# Patient Record
Sex: MALE | Race: WHITE | NOT HISPANIC OR LATINO | Employment: OTHER | URBAN - METROPOLITAN AREA
[De-identification: names, ages, dates, MRNs, and addresses within clinical notes are randomized per-mention and may not be internally consistent; named-entity substitution may affect disease eponyms.]

---

## 2017-05-08 ENCOUNTER — IMPORTED ENCOUNTER (OUTPATIENT)
Dept: URBAN - METROPOLITAN AREA CLINIC 43 | Facility: CLINIC | Age: 82
End: 2017-05-08

## 2017-05-08 PROBLEM — H35.3132: Noted: 2017-05-08

## 2017-05-12 ENCOUNTER — IMPORTED ENCOUNTER (OUTPATIENT)
Dept: URBAN - METROPOLITAN AREA CLINIC 43 | Facility: CLINIC | Age: 82
End: 2017-05-12

## 2017-08-25 ENCOUNTER — IMPORTED ENCOUNTER (OUTPATIENT)
Dept: URBAN - METROPOLITAN AREA CLINIC 43 | Facility: CLINIC | Age: 82
End: 2017-08-25

## 2018-04-16 NOTE — PATIENT DISCUSSION
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 NO SIGN OF ACTIVE INFLAMMATION. PHOTO TAKEN TODAY. REFER TO RETINAL SPECIALIST, DR. Earl Stevenson.

## 2018-04-16 NOTE — PATIENT DISCUSSION
SUBJECTIVE PERIOCULAR DISCOMFORT OD - NO EVIDENCE OF PROPTOSIS, RESTRICTION IN GAZE OR ORBITAL PATHOLOGY. PATIENT SLEEPS ON LEFT, MAY BE RELATED TO MILD NOCTURNAL EXPOSURE OD. TREAT DED.

## 2018-05-02 NOTE — PATIENT DISCUSSION
Myopia Counseling: The diagnosis of myopia (nearsightedness) was discussed with the patient. I explained to the patient that people who are nearsighted may be at an increased risk of a retinal detachment. Possible symptoms of retinal detachment including new onset of significant floaters or flashes or a sudden decrease in vision were reviewed with the patient. The patient understands that any of these symptoms require an immediate call to the office for an examination that day. Return for follow-up as scheduled.

## 2018-05-02 NOTE — PATIENT DISCUSSION
Chorioretinal Scar is an area of pigmentary change or fibrosis that is located on the inside surface of the eye. These may be the result of an old infection or injury, but in some cases we do not know the source. If the chorioretinal scar is in the central retina, it can cause decreased vision. However, in most cases it is located away from the center and does not cause any problems.

## 2018-05-02 NOTE — PATIENT DISCUSSION
RETINA IS ATTACHED OU:  SMALL PERIPHERAL FLAT CHORIORETINAL SCAR OD; NO HOLES OR TEARS SEEN ON DILATED EXAM TODAY.  RETINAL DETACHMENT SIGNS AND SYMPTOMS REVIEWED

## 2018-05-18 ENCOUNTER — IMPORTED ENCOUNTER (OUTPATIENT)
Dept: URBAN - METROPOLITAN AREA CLINIC 43 | Facility: CLINIC | Age: 83
End: 2018-05-18

## 2018-05-18 PROBLEM — H35.3132: Noted: 2018-05-18

## 2018-05-25 ENCOUNTER — IMPORTED ENCOUNTER (OUTPATIENT)
Dept: URBAN - METROPOLITAN AREA CLINIC 43 | Facility: CLINIC | Age: 83
End: 2018-05-25

## 2018-10-23 NOTE — PATIENT DISCUSSION
UNRELIABLE SCHIRMER TEST RESULTS - PT. DECLINES DRY EYE SYMPTOMS AND IS HAVING NO ISSUES WITH CONTACTS. DISC POSSIBLE NEED FOR PUNCTAL PLUGS AFTER PROCEDURE IF SYMPTOMS ARISE.

## 2018-10-25 NOTE — PATIENT DISCUSSION
PT UNDERSTANDS OPTIONS / RISKS AND DESIRES TO PROCEED WITH LASIK TO IMPROVE VA AND REDUCE DEPENDENCY ON GLS/CTLS.

## 2018-10-25 NOTE — PATIENT DISCUSSION
AFTER DISCUSSION, PATIENT WAS GIVEN THE OPTION TO NOT PROCEED WITH TREATMENT AND TO CONTINUE WITH GLASSES/CONTACTS.

## 2018-10-25 NOTE — PATIENT DISCUSSION
HIGH MYOPIA, OU- DISC OPT OF REFRACTIVE [id number]. DISCUSSED INCREASED RISK OF NEEDING A ENHANCEMENT AND HALO'S AROUND LIGHTS AT NIGHT.

## 2018-10-25 NOTE — PATIENT DISCUSSION
LARGE PUPILS, OU - DISC IN DETAIL THE INCREASED RISK OF NIGHT VISION SYMPTOMS SUCH GLARE, HALOS, STEAMERS AROUND LIGHTS AND STARBURSTS THAT MAY AFFECT VISION FOR WORK. DETAILS OF THE STUDY PERFORMED BY THE  AND FDA TO DETERMINE PROBABILITY OF GLARE AS A RESULT OF HAVING LASIK WAS REVIEWED WITH THE PATIENT. DISC IF SYMPTOMS PRESENT, EYE DROPS MAY HELP TO DECREASE SOME OF THESE SYMPTOMS BUT THAT THERE IS NO GUARANTEE.

## 2018-10-25 NOTE — PATIENT DISCUSSION
DISC ALTHOUGH NOT SEEN IN MY [facility] NOR IN [facility], THERE HAS BEEN RECENT REPORTS OF CHRONIC PAIN FOLLOWING LASIK.

## 2018-10-26 NOTE — PATIENT DISCUSSION
Continue: prednisolone acetate (prednisolone acetate): drops,suspension: 1% 1 drop four times a day as directed

## 2018-11-30 ENCOUNTER — IMPORTED ENCOUNTER (OUTPATIENT)
Dept: URBAN - METROPOLITAN AREA CLINIC 43 | Facility: CLINIC | Age: 83
End: 2018-11-30

## 2019-04-19 NOTE — PATIENT DISCUSSION
Stopped Today: prednisolone acetate (prednisolone acetate): drops,suspension: 1% 1 drop four times a day as directed

## 2019-04-19 NOTE — PATIENT DISCUSSION
SMALL PERIPHERAL FLAT CHORIORETINAL SCAR OD; NO HOLES OR TEARS SEEN ON DILATED EXAM TODAY.  RETINAL DETACHMENT SIGNS AND SYMPTOMS REVIEWED

## 2019-06-11 ENCOUNTER — IMPORTED ENCOUNTER (OUTPATIENT)
Dept: URBAN - METROPOLITAN AREA CLINIC 43 | Facility: CLINIC | Age: 84
End: 2019-06-11

## 2019-06-11 PROBLEM — H35.3231: Noted: 2019-06-11

## 2019-09-20 NOTE — PATIENT DISCUSSION
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 38

## 2019-09-27 ENCOUNTER — IMPORTED ENCOUNTER (OUTPATIENT)
Dept: URBAN - METROPOLITAN AREA CLINIC 43 | Facility: CLINIC | Age: 84
End: 2019-09-27

## 2020-04-18 ASSESSMENT — VISUAL ACUITY
OD_SC: J5
OD_SC: 20/40 -2
OD_OTHER: 20/100.
OD_OTHER: 20/70.
OS_SC: J2
OD_SC: 20/25-2
OD_SC: 20/25 -2
OS_SC: 20/30-2
OS_CC: J1+
OD_CC: J1
OS_SC: 20/30-2
OD_OTHER: 20/40-2.
OS_CC: J2
OD_SC: 20/30
OS_SC: 20/30
OS_SC: 20/25-2
OD_CC: J1+

## 2020-04-18 ASSESSMENT — TONOMETRY
OS_IOP_MMHG: 20.0
OS_IOP_MMHG: 16.0
OD_IOP_MMHG: 20.0
OD_IOP_MMHG: 19.0
OS_IOP_MMHG: 18.0
OD_IOP_MMHG: 17.0

## 2020-04-18 ASSESSMENT — KERATOMETRY
OS_AXISANGLE_DEGREES: 164
OD_AXISANGLE_DEGREES: 6
OS_K2POWER_DIOPTERS: 42.75
OD_K2POWER_DIOPTERS: 42.5
OD_K1POWER_DIOPTERS: 41.5
OS_AXISANGLE2_DEGREES: 74
OS_K1POWER_DIOPTERS: 41.5
OD_AXISANGLE2_DEGREES: 96

## 2020-07-29 NOTE — PROCEDURE NOTE: CLINICAL
PROCEDURE NOTE: Laser for Retinal Tear OD. Diagnosis: Horseshoe Tear of Retina Without Detachment. Prior to laser, risks/benefits/alternatives to laser discussed including loss of vision, decreased peripheral and night vision, need for more laser and/or surgery and patient wished to proceed. A written consent is on file, and the need for today’s laser was discussed and the patient is understanding and wishes to proceed. Laser Lens: SuperQuad. Wavelength: Argon Green. Spot size: 200 um. Pulse power: 300 mW. Number of pulses: 40. Patient tolerated procedure well. There were no complications. Post-op instructions given. Patient given office phone number/answering service number and advised to call immediately should there be loss of vision or pain, or should they have any other questions or concerns. Mamie Sweeney

## 2021-06-24 ENCOUNTER — ESTABLISHED COMPREHENSIVE EXAM (OUTPATIENT)
Dept: URBAN - METROPOLITAN AREA CLINIC 32 | Facility: CLINIC | Age: 86
End: 2021-06-24

## 2021-06-24 DIAGNOSIS — H35.3121: ICD-10-CM

## 2021-06-24 DIAGNOSIS — H35.3212: ICD-10-CM

## 2021-06-24 DIAGNOSIS — H34.231: ICD-10-CM

## 2021-06-24 PROCEDURE — 92015 DETERMINE REFRACTIVE STATE: CPT

## 2021-06-24 PROCEDURE — 92014 COMPRE OPH EXAM EST PT 1/>: CPT

## 2021-06-24 ASSESSMENT — KERATOMETRY
OS_K1POWER_DIOPTERS: 42.75
OS_AXISANGLE2_DEGREES: 72
OD_AXISANGLE2_DEGREES: 94
OD_K1POWER_DIOPTERS: 42.50
OD_AXISANGLE_DEGREES: 4
OD_K2POWER_DIOPTERS: 41.25
OS_K2POWER_DIOPTERS: 41.25
OS_AXISANGLE_DEGREES: 162

## 2021-06-24 ASSESSMENT — VISUAL ACUITY
OS_CC: J1-3
OD_SC: 20/50
OS_SC: 20/40
OS_PH: 20/30-1
OD_CC: J1-1

## 2021-06-24 ASSESSMENT — TONOMETRY
OS_IOP_MMHG: 20
OD_IOP_MMHG: 20

## 2022-06-04 ENCOUNTER — TELEPHONE ENCOUNTER (OUTPATIENT)
Dept: URBAN - METROPOLITAN AREA CLINIC 68 | Facility: CLINIC | Age: 87
End: 2022-06-04

## 2022-06-04 RX ORDER — MULTIVITAMIN
MULTIPLE VITAMIN(  ORAL  DAILY ) INACTIVE -HX ENTRY TABLET ORAL DAILY
OUTPATIENT
Start: 2016-04-27

## 2022-06-04 RX ORDER — INDOMETHACIN 25 MG/5ML
INDOCIN( 25MG/5ML ORAL  AS NEEDED ) INACTIVE -HX ENTRY SUSPENSION ORAL AS NEEDED
OUTPATIENT
Start: 2016-04-26

## 2022-06-04 RX ORDER — VITAMIN E (DL,TOCOPHERYL ACET) 45 MG/0.25
BEE POLLEN( 500MG ORAL  DAILY ) INACTIVE -HX ENTRY DROPS ORAL DAILY
OUTPATIENT
Start: 2016-04-27

## 2022-06-05 ENCOUNTER — TELEPHONE ENCOUNTER (OUTPATIENT)
Dept: URBAN - METROPOLITAN AREA CLINIC 68 | Facility: CLINIC | Age: 87
End: 2022-06-05

## 2022-06-05 RX ORDER — FERROUS SULFATE 325(65) MG
VITAMIN D3( 2000UNIT ORAL  DAILY ) ACTIVE -HX ENTRY TABLET ORAL DAILY
Status: ACTIVE | COMMUNITY
Start: 2016-04-27

## 2022-06-05 RX ORDER — VIT C/E/ZN/COPPR/LUTEIN/ZEAXAN 250MG-90MG
PRESERVISION AREDS 2(  ORAL  DAILY ) ACTIVE -HX ENTRY CAPSULE ORAL DAILY
Status: ACTIVE | COMMUNITY
Start: 2016-04-27

## 2022-06-05 RX ORDER — FINASTERIDE 1 MG/1
FINASTERIDE( 1MG ORAL  DAILY ) ACTIVE -HX ENTRY TABLET, FILM COATED ORAL DAILY
Status: ACTIVE | COMMUNITY
Start: 2016-04-27

## 2022-06-25 ENCOUNTER — TELEPHONE ENCOUNTER (OUTPATIENT)
Age: 87
End: 2022-06-25

## 2022-06-25 RX ORDER — SODIUM SULFATE, POTASSIUM SULFATE, MAGNESIUM SULFATE 17.5; 3.13; 1.6 G/ML; G/ML; G/ML
SOLUTION, CONCENTRATE ORAL AS DIRECTED
Qty: 1 | Refills: 0 | OUTPATIENT
Start: 2014-05-22 | End: 2016-01-28

## 2022-06-25 RX ORDER — ALUMINUM ZIRCONIUM TETRACHLOROHYDREX GLY 0.18 G/G
STICK TOPICAL DAILY
Qty: 30 | Refills: 0 | OUTPATIENT
Start: 2014-05-22 | End: 2016-01-28

## 2022-06-25 RX ORDER — INDOMETHACIN 25 MG/5ML
INDOCIN( 25MG/5ML ORAL  AS NEEDED ) INACTIVE -HX ENTRY SUSPENSION ORAL AS NEEDED
OUTPATIENT
Start: 2016-04-26

## 2022-06-26 ENCOUNTER — TELEPHONE ENCOUNTER (OUTPATIENT)
Age: 87
End: 2022-06-26

## 2022-06-26 RX ORDER — FINASTERIDE 1 MG/1
FINASTERIDE( 1MG ORAL  DAILY ) ACTIVE -HX ENTRY TABLET, FILM COATED ORAL DAILY
Status: ACTIVE | COMMUNITY
Start: 2016-04-27

## 2022-06-26 RX ORDER — GLUCOSAMINE/MSM/CHONDROIT SULF 500-166.6
VITAMIN D3( 2000UNIT ORAL  DAILY ) ACTIVE -HX ENTRY TABLET ORAL DAILY
Status: ACTIVE | COMMUNITY
Start: 2016-04-27

## 2022-06-27 ENCOUNTER — COMPREHENSIVE EXAM (OUTPATIENT)
Dept: URBAN - METROPOLITAN AREA CLINIC 32 | Facility: CLINIC | Age: 87
End: 2022-06-27

## 2022-06-27 DIAGNOSIS — H35.3212: ICD-10-CM

## 2022-06-27 DIAGNOSIS — H43.813: ICD-10-CM

## 2022-06-27 DIAGNOSIS — H35.3121: ICD-10-CM

## 2022-06-27 DIAGNOSIS — H34.231: ICD-10-CM

## 2022-06-27 PROCEDURE — 92250 FUNDUS PHOTOGRAPHY W/I&R: CPT

## 2022-06-27 PROCEDURE — 99214 OFFICE O/P EST MOD 30 MIN: CPT

## 2022-06-27 ASSESSMENT — KERATOMETRY
OS_K2POWER_DIOPTERS: 41.25
OS_AXISANGLE2_DEGREES: 72
OS_AXISANGLE_DEGREES: 162
OD_K1POWER_DIOPTERS: 42.50
OS_K1POWER_DIOPTERS: 42.75
OD_AXISANGLE_DEGREES: 4
OD_K2POWER_DIOPTERS: 41.25
OD_AXISANGLE2_DEGREES: 94

## 2022-06-27 ASSESSMENT — VISUAL ACUITY
OS_CC: 20/30+2
OS_CC: J1
OD_CC: 20/80
OD_CC: J1
OS_GLARE: 20/80
OD_GLARE: 20/200

## 2022-06-27 ASSESSMENT — TONOMETRY
OS_IOP_MMHG: 21
OD_IOP_MMHG: 20

## 2022-10-06 ENCOUNTER — FOLLOW UP (OUTPATIENT)
Dept: URBAN - METROPOLITAN AREA CLINIC 32 | Facility: CLINIC | Age: 87
End: 2022-10-06

## 2022-10-06 DIAGNOSIS — Z96.1: ICD-10-CM

## 2022-10-06 DIAGNOSIS — H35.3121: ICD-10-CM

## 2022-10-06 DIAGNOSIS — H43.813: ICD-10-CM

## 2022-10-06 DIAGNOSIS — H34.231: ICD-10-CM

## 2022-10-06 DIAGNOSIS — H35.3212: ICD-10-CM

## 2022-10-06 PROCEDURE — 92014 COMPRE OPH EXAM EST PT 1/>: CPT

## 2022-10-06 PROCEDURE — 92015 DETERMINE REFRACTIVE STATE: CPT

## 2022-10-06 ASSESSMENT — KERATOMETRY
OS_AXISANGLE2_DEGREES: 72
OD_K2POWER_DIOPTERS: 41.25
OS_K1POWER_DIOPTERS: 42.75
OS_AXISANGLE_DEGREES: 162
OS_K2POWER_DIOPTERS: 41.25
OD_AXISANGLE_DEGREES: 4
OD_K1POWER_DIOPTERS: 42.50
OD_AXISANGLE2_DEGREES: 94

## 2022-10-06 ASSESSMENT — VISUAL ACUITY
OU_CC: 20/30
OS_CC: 20/30
OD_CC: 20/70

## 2023-08-24 NOTE — PATIENT DISCUSSION
REFRACTIVE ERROR, OU - PATIENT IS A GOOD CANDIDATE FOR LASIK SURGERY OU. Mirvaso Counseling: Mirvaso is a topical medication which can decrease superficial blood flow where applied. Side effects are uncommon and include stinging, redness and allergic reactions.

## 2023-12-12 ENCOUNTER — COMPREHENSIVE EXAM (OUTPATIENT)
Dept: URBAN - METROPOLITAN AREA CLINIC 32 | Facility: CLINIC | Age: 88
End: 2023-12-12

## 2023-12-12 DIAGNOSIS — H35.3211: ICD-10-CM

## 2023-12-12 DIAGNOSIS — H43.813: ICD-10-CM

## 2023-12-12 DIAGNOSIS — H35.3121: ICD-10-CM

## 2023-12-12 DIAGNOSIS — H34.231: ICD-10-CM

## 2023-12-12 PROCEDURE — 92134 CPTRZ OPH DX IMG PST SGM RTA: CPT

## 2023-12-12 PROCEDURE — 99214 OFFICE O/P EST MOD 30 MIN: CPT

## 2023-12-12 ASSESSMENT — TONOMETRY
OS_IOP_MMHG: 12
OD_IOP_MMHG: 13

## 2023-12-12 ASSESSMENT — KERATOMETRY
OS_K2POWER_DIOPTERS: 41.25
OD_K2POWER_DIOPTERS: 41.25
OS_AXISANGLE2_DEGREES: 72
OS_AXISANGLE_DEGREES: 162
OD_K1POWER_DIOPTERS: 42.50
OD_AXISANGLE_DEGREES: 4
OD_AXISANGLE2_DEGREES: 94
OS_K1POWER_DIOPTERS: 42.75

## 2023-12-12 ASSESSMENT — VISUAL ACUITY
OD_SC: 20/100
OS_SC: 20/70

## 2025-01-29 ENCOUNTER — COMPREHENSIVE EXAM (OUTPATIENT)
Age: 89
End: 2025-01-29

## 2025-01-29 DIAGNOSIS — H35.3121: ICD-10-CM

## 2025-01-29 DIAGNOSIS — H35.3211: ICD-10-CM

## 2025-01-29 DIAGNOSIS — E11.3293: ICD-10-CM

## 2025-01-29 DIAGNOSIS — H34.231: ICD-10-CM

## 2025-01-29 DIAGNOSIS — H43.813: ICD-10-CM

## 2025-01-29 PROCEDURE — 92250 FUNDUS PHOTOGRAPHY W/I&R: CPT

## 2025-01-29 PROCEDURE — 92015 DETERMINE REFRACTIVE STATE: CPT

## 2025-01-29 PROCEDURE — 99214 OFFICE O/P EST MOD 30 MIN: CPT
